# Patient Record
Sex: MALE
[De-identification: names, ages, dates, MRNs, and addresses within clinical notes are randomized per-mention and may not be internally consistent; named-entity substitution may affect disease eponyms.]

---

## 2018-04-16 PROBLEM — Z00.00 ENCOUNTER FOR PREVENTIVE HEALTH EXAMINATION: Status: ACTIVE | Noted: 2018-04-16

## 2018-05-31 ENCOUNTER — APPOINTMENT (OUTPATIENT)
Dept: UROLOGY | Facility: CLINIC | Age: 23
End: 2018-05-31
Payer: MEDICAID

## 2018-05-31 VITALS
HEART RATE: 64 BPM | DIASTOLIC BLOOD PRESSURE: 71 MMHG | BODY MASS INDEX: 32.28 KG/M2 | HEIGHT: 68 IN | SYSTOLIC BLOOD PRESSURE: 121 MMHG | WEIGHT: 213 LBS

## 2018-05-31 DIAGNOSIS — Z78.9 OTHER SPECIFIED HEALTH STATUS: ICD-10-CM

## 2018-05-31 DIAGNOSIS — N46.9 MALE INFERTILITY, UNSPECIFIED: ICD-10-CM

## 2018-05-31 PROCEDURE — 99203 OFFICE O/P NEW LOW 30 MIN: CPT

## 2018-05-31 NOTE — ASSESSMENT
[FreeTextEntry1] : His physical exam shows him to be slightly overweight but otherwise in good shape and spirits. The exam of the genitalia is all normal with testes that are about 30 – 32 mL in size. I thought I might feel a faint impulse on the left but I wasn’t sure and we surely not enough for me to say it’s a clinical varicocele.\par \par I contacted Rabbi Jain from [facility]. He will see if he has any records. Whatever is missing we will need to get which at this point would include hormones, I want a copy of the semen analysis, a scrotal ultrasound both supine and upright pre-and with Ignacia Veras and he will then come back in for review.\par

## 2018-05-31 NOTE — PHYSICAL EXAM
[General Appearance - Well Developed] : well developed [General Appearance - Well Nourished] : well nourished [Normal Appearance] : normal appearance [Well Groomed] : well groomed [General Appearance - In No Acute Distress] : no acute distress [Heart Rate And Rhythm] : Heart rate and rhythm were normal [] : no respiratory distress [Respiration, Rhythm And Depth] : normal respiratory rhythm and effort [Exaggerated Use Of Accessory Muscles For Inspiration] : no accessory muscle use [Auscultation Breath Sounds / Voice Sounds] : lungs were clear to auscultation bilaterally [Abdomen Soft] : soft [Abdomen Tenderness] : non-tender [Abdomen Hernia] : no hernia was discovered [Costovertebral Angle Tenderness] : no ~M costovertebral angle tenderness [Urethral Meatus] : meatus normal [Penis Abnormality] : normal circumcised penis [Scrotum] : the scrotum was normal [Epididymis] : the epididymides were normal [Testes Tenderness] : no tenderness of the testes [Testes Mass (___cm)] : there were no testicular masses [Rectal Exam - Rectum] : digital rectal exam was normal [Prostate Enlargement] : the prostate was not enlarged [Prostate Tenderness] : the prostate was not tender [No Prostate Nodules] : no prostate nodules [Prostate Size ___ gm] : prostate size [unfilled] gm [Normal Station and Gait] : the gait and station were normal for the patient's age [No Focal Deficits] : no focal deficits [FreeTextEntry1] : DTR's & BC reflexes were intact  [Oriented To Time, Place, And Person] : oriented to person, place, and time [Affect] : the affect was normal [Mood] : the mood was normal [Not Anxious] : not anxious

## 2018-05-31 NOTE — HISTORY OF PRESENT ILLNESS
[FreeTextEntry1] : Eddy is a 22-year-old [employer]ond[employer]ational [employer] orth[employer] male who has been  to his wife Nel for almost 2 ½ years in a good relationship. They are together about two or three times per week during the clean portion of her cycle but unfortunately to date she is not yet conceived. Neither one has prior partner and he tells me he said no exam of the genitalia by any of the physician. He denies any other urologic issues. There is no past medical surgical history, he has no known drug allergies and takes no medication. In general he is in good health probably little Jono that he should be but he was like that before his wedding. There is no history of injuries accidents or radiation treatment. 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 When they have relations they do not need to use any exogenous lubrication, his wife knows when she is ovulating, she has seen Dr. Reza who did a full workup including ultrasound x-rays the blood test, and I make sure to have relations around the time of her ovulation. He tells me that a semen analysis was done using the semen collection device and showed suboptimal sperm he just doesn’t know exactly what.\par \par His wife’s cycle lasts five days out of 28 – 32 with her ovulating about a 14 and going to [facility] on day 12.\par \par He is one of 10 he has six brothers three are  all have kids. He has three sisters all single. She is one of five hot three brothers and sister all single.\par \par  [None] : no symptoms

## 2018-05-31 NOTE — LETTER BODY
[Dear  ___] : Dear ~MOIRA, [Consult Letter:] : I had the pleasure of evaluating your patient, [unfilled]. [Please see my note below.] : Please see my note below. [Consult Closing:] : Thank you very much for allowing me to participate in the care of this patient.  If you have any questions, please do not hesitate to contact me. [Sincerely,] : Sincerely, [FreeTextEntry2] : o [facility]\par [address]\[place] [address]\par  [DrGaby  ___] : Dr. DE SOUZA

## 2018-05-31 NOTE — LETTER HEADER
[Beatris Berman MD] : Beatris Berman MD [Director of Urology] : Director of Urology [Director of Male Infertility] : Director of Male Infertility [[address]] : [address] [[address]] : [address] [[place] [address]] : [place] [address] [Tel [phone]] : Tel [phone] [Fax [phone]] : Fax [phone]

## 2018-08-15 ENCOUNTER — APPOINTMENT (OUTPATIENT)
Dept: UROLOGY | Facility: CLINIC | Age: 23
End: 2018-08-15

## 2022-09-09 NOTE — REVIEW OF SYSTEMS
